# Patient Record
Sex: FEMALE | Race: WHITE | NOT HISPANIC OR LATINO | Employment: OTHER | URBAN - METROPOLITAN AREA
[De-identification: names, ages, dates, MRNs, and addresses within clinical notes are randomized per-mention and may not be internally consistent; named-entity substitution may affect disease eponyms.]

---

## 2017-01-16 ENCOUNTER — TRANSCRIBE ORDERS (OUTPATIENT)
Dept: MAMMOGRAPHY | Facility: HOSPITAL | Age: 62
End: 2017-01-16

## 2017-01-16 DIAGNOSIS — Z12.31 VISIT FOR SCREENING MAMMOGRAM: Primary | ICD-10-CM

## 2017-01-24 ENCOUNTER — HOSPITAL ENCOUNTER (OUTPATIENT)
Dept: MAMMOGRAPHY | Facility: HOSPITAL | Age: 62
Discharge: HOME | End: 2017-01-24
Attending: OBSTETRICS & GYNECOLOGY | Admitting: OBSTETRICS & GYNECOLOGY

## 2017-01-24 DIAGNOSIS — Z12.31 VISIT FOR SCREENING MAMMOGRAM: ICD-10-CM

## 2017-01-24 PROCEDURE — 77063 BREAST TOMOSYNTHESIS BI: CPT

## 2017-01-24 PROCEDURE — 77067 SCR MAMMO BI INCL CAD: CPT | Performed by: RADIOLOGY

## 2017-01-24 PROCEDURE — 77063 BREAST TOMOSYNTHESIS BI: CPT | Performed by: RADIOLOGY

## 2017-01-24 PROCEDURE — G0202 SCR MAMMO BI INCL CAD: HCPCS

## 2017-07-19 DIAGNOSIS — Z79.890 POST-MENOPAUSE ON HRT (HORMONE REPLACEMENT THERAPY): ICD-10-CM

## 2017-07-19 RX ORDER — ESTRADIOL 0.03 MG/D
FILM, EXTENDED RELEASE TRANSDERMAL
Qty: 24 PATCH | Refills: 0 | Status: SHIPPED | OUTPATIENT
Start: 2017-07-19 | End: 2017-10-03

## 2017-09-29 DIAGNOSIS — N32.81 OAB (OVERACTIVE BLADDER): ICD-10-CM

## 2017-09-29 RX ORDER — MIRABEGRON 50 MG/1
TABLET, FILM COATED, EXTENDED RELEASE ORAL
Qty: 90 TABLET | Refills: 3 | OUTPATIENT
Start: 2017-09-29

## 2017-10-03 ENCOUNTER — OFFICE VISIT (OUTPATIENT)
Dept: OBSTETRICS AND GYNECOLOGY | Facility: CLINIC | Age: 62
End: 2017-10-03

## 2017-10-03 VITALS
SYSTOLIC BLOOD PRESSURE: 134 MMHG | DIASTOLIC BLOOD PRESSURE: 80 MMHG | BODY MASS INDEX: 33.15 KG/M2 | WEIGHT: 175.6 LBS | HEIGHT: 61 IN

## 2017-10-03 DIAGNOSIS — Z01.419 ENCOUNTER FOR GYNECOLOGICAL EXAMINATION WITHOUT ABNORMAL FINDING: ICD-10-CM

## 2017-10-03 DIAGNOSIS — Z79.890 POST-MENOPAUSE ON HRT (HORMONE REPLACEMENT THERAPY): Primary | ICD-10-CM

## 2017-10-03 DIAGNOSIS — N32.81 OAB (OVERACTIVE BLADDER): ICD-10-CM

## 2017-10-03 PROCEDURE — 99396 PREV VISIT EST AGE 40-64: CPT | Performed by: OBSTETRICS & GYNECOLOGY

## 2017-10-03 RX ORDER — LOSARTAN POTASSIUM 50 MG/1
1 TABLET ORAL DAILY
COMMUNITY
Start: 2017-09-07

## 2017-10-03 RX ORDER — MIRABEGRON 50 MG/1
50 TABLET, FILM COATED, EXTENDED RELEASE ORAL DAILY
Qty: 90 TABLET | Refills: 3 | Status: SHIPPED | OUTPATIENT
Start: 2017-10-03 | End: 2018-10-09 | Stop reason: SDUPTHER

## 2017-10-03 RX ORDER — CLOBETASOL PROPIONATE 0.5 MG/G
1 CREAM TOPICAL AS NEEDED
COMMUNITY
Start: 2017-06-27

## 2017-10-03 RX ORDER — MELOXICAM 15 MG/1
1 TABLET ORAL AS NEEDED
COMMUNITY
Start: 2017-09-19 | End: 2019-10-10

## 2017-10-03 NOTE — PROGRESS NOTES
Chief Complaint   Patient presents with   • Gynecologic Exam     discuss discontinuing estrogen therapy   • Med Refill       Bindu Negron is a 62 y.o. year old  presenting to be seen for her annual exam.This patient has previously had an LSH/BSO and a laparoscopic Goldsmith procedure.  She has no symptoms of stress incontinence.  She is treated for overactive bladder with Myrbetriq, 50 mg daily and has decreased urgency and nocturia.  She does have some dryness of the mouth.  She desires to discontinue estradiol patches.  She is currently using 0.025 mg every 3-1/2 days.  She has no menopausal symptoms.    SCREENING TESTS    Year 2012   Age                         PAP                         HPV high risk                         Mammogram      benign                   RAISSA score                         Breast MRI                         Lipids                         Vitamin D                         Colonoscopy                         DEXA  Frax (hip/any)                         Ovarian Screen                             She exercises regularly: yes.  She wears her seat belt: yes.  She has concerns about domestic violence: no.  She has noticed changes in height: no    GYN screening history:  · Last mammogram: was done on approximately 2017 and the result was: Birads I (Normal)..    No Additional Complaints Reported    The following portions of the patient's history were reviewed and updated as appropriate:vital signs and   She  does not have any pertinent problems on file.  She  has a past surgical history that includes Tubal ligation; Laparoscopic Goldsmith procedure; supracervical hysterectomy salpingo oophorectomy (Bilateral); and Oophorectomy (Bilateral).  Her family history includes Depression in her mother; Heart disease in her mother; Hypertension in her father; Lung cancer in her brother. There  "is no history of Breast cancer or Ovarian cancer.  She  reports that she has never smoked. She has never used smokeless tobacco. She reports that she drinks alcohol. She reports that she does not use illicit drugs.  Current Outpatient Prescriptions   Medication Sig Dispense Refill   • clobetasol (TEMOVATE) 0.05 % cream Apply 1 application topically As Needed.     • escitalopram (LEXAPRO) 20 MG tablet Take 1 tablet by mouth daily.     • losartan (COZAAR) 50 MG tablet Take 1 tablet by mouth Daily.     • meloxicam (MOBIC) 15 MG tablet Take 1 tablet by mouth As Needed.     • MYRBETRIQ 50 MG tablet sustained-release 24 hour 24 hr tablet Take 50 mg by mouth Daily. 90 tablet 3   • omeprazole (PriLOSEC) 20 MG capsule Take 1 capsule by mouth daily.       No current facility-administered medications for this visit.      She has No Known Allergies..    Review of Systems  A comprehensive review of systems was taken.  Constitutional: negative for fever, chills, activity change, appetite change, fatigue and unexpected weight change.  Respiratory: negative  Cardiovascular: negative  Gastrointestinal: negative  Genitourinary:positive for urgency  Musculoskeletal:negative  Behavioral/Psych: negative       /80  Ht 61\" (154.9 cm)  Wt 175 lb 9.6 oz (79.7 kg)  LMP  (LMP Unknown) Comment: S/P LSH, BSO  BMI 33.18 kg/m2    Physical Exam    General:  alert; cooperative; well developed; well nourished  obese - Body mass index is 33.18 kg/(m^2).   Skin:  No suspicious lesions seen   Thyroid: normal to inspection and palpation   Lungs:  clear to auscultation bilaterally   Heart:  regular rate and rhythm, S1, S2 normal, no murmur, click, rub or gallop   Breasts:  Examined in supine position  Symmetric without masses or skin dimpling  Nipples normal without inversion, lesions or discharge  There are no palpable axillary nodes   Abdomen: soft, non-tender; no masses  no umbilical or inginual hernias are present  no hepato-splenomegaly "   Pelvis: Clinical staff was present for exam  External genitalia:  normal appearance of the external genitalia including Bartholin's and [place]'s glands.  Vaginal:  normal pink mucosa without prolapse or lesions.  Cervix:  normal appearance.  Uterus:  absent.  Adnexa:  absent, bilateral.  Rectal:  anus visually normal appearing. recto-vaginal exam unremarkable and confirms findings;     Lab Review   No data reviewed    Imaging  Mammogram results- benign         ASSESSMENT  Problems Addressed this Visit        Musculoskeletal and Integument    OAB (overactive bladder)    Relevant Medications    MYRBETRIQ 50 MG tablet sustained-release 24 hour 24 hr tablet       Genitourinary    Post-menopause on HRT (hormone replacement therapy) - Primary      Other Visit Diagnoses     Encounter for gynecological examination without abnormal finding              PLAN    Medications prescribed this encounter:    New Medications Ordered This Visit   Medications   • clobetasol (TEMOVATE) 0.05 % cream     Sig: Apply 1 application topically As Needed.   • losartan (COZAAR) 50 MG tablet     Sig: Take 1 tablet by mouth Daily.   • meloxicam (MOBIC) 15 MG tablet     Sig: Take 1 tablet by mouth As Needed.   • MYRBETRIQ 50 MG tablet sustained-release 24 hour 24 hr tablet     Sig: Take 50 mg by mouth Daily.     Dispense:  90 tablet     Refill:  3   ·   · Calcium, 600 mg/ Vit. D, 400 IU daily; regular weight-bearing exercise  · Follow up: 12 month(s)  *Please note that portions of this documentation may have been completed with a voice recognition program.  Efforts were made to edit this dictation, but occasional words may have been mistranscribed.       This note was electronically signed.    MIKE Duarte MD  October 3, 2017  11:39 AM

## 2017-10-11 DIAGNOSIS — Z79.890 POST-MENOPAUSE ON HRT (HORMONE REPLACEMENT THERAPY): ICD-10-CM

## 2017-10-11 RX ORDER — ESTRADIOL 0.03 MG/D
FILM, EXTENDED RELEASE TRANSDERMAL
Refills: 0 | OUTPATIENT
Start: 2017-10-11

## 2018-03-19 ENCOUNTER — TRANSCRIBE ORDERS (OUTPATIENT)
Dept: ADMINISTRATIVE | Facility: HOSPITAL | Age: 63
End: 2018-03-19

## 2018-03-19 DIAGNOSIS — Z12.31 VISIT FOR SCREENING MAMMOGRAM: Primary | ICD-10-CM

## 2018-04-05 ENCOUNTER — HOSPITAL ENCOUNTER (OUTPATIENT)
Dept: MAMMOGRAPHY | Facility: HOSPITAL | Age: 63
Discharge: HOME | End: 2018-04-05
Attending: OBSTETRICS & GYNECOLOGY | Admitting: OBSTETRICS & GYNECOLOGY

## 2018-04-05 DIAGNOSIS — Z12.31 VISIT FOR SCREENING MAMMOGRAM: ICD-10-CM

## 2018-04-05 PROCEDURE — 77067 SCR MAMMO BI INCL CAD: CPT | Performed by: RADIOLOGY

## 2018-04-05 PROCEDURE — 77063 BREAST TOMOSYNTHESIS BI: CPT | Performed by: RADIOLOGY

## 2018-04-05 PROCEDURE — 77067 SCR MAMMO BI INCL CAD: CPT

## 2018-04-05 PROCEDURE — 77063 BREAST TOMOSYNTHESIS BI: CPT

## 2018-10-09 ENCOUNTER — OFFICE VISIT (OUTPATIENT)
Dept: OBSTETRICS AND GYNECOLOGY | Facility: CLINIC | Age: 63
End: 2018-10-09

## 2018-10-09 VITALS
DIASTOLIC BLOOD PRESSURE: 64 MMHG | BODY MASS INDEX: 36.51 KG/M2 | HEIGHT: 61 IN | WEIGHT: 193.4 LBS | SYSTOLIC BLOOD PRESSURE: 108 MMHG

## 2018-10-09 DIAGNOSIS — N32.81 OAB (OVERACTIVE BLADDER): Primary | ICD-10-CM

## 2018-10-09 DIAGNOSIS — Z01.419 ENCOUNTER FOR GYNECOLOGICAL EXAMINATION WITHOUT ABNORMAL FINDING: ICD-10-CM

## 2018-10-09 DIAGNOSIS — Z78.0 MENOPAUSE: ICD-10-CM

## 2018-10-09 PROCEDURE — G0101 CA SCREEN;PELVIC/BREAST EXAM: HCPCS | Performed by: OBSTETRICS & GYNECOLOGY

## 2018-10-09 NOTE — PROGRESS NOTES
Chief Complaint   Patient presents with   • Gynecologic Exam   • Med Refill       Bindu Negron is a 63 y.o. year old  presenting to be seen for her annual exam.  This patient has previously had an LSH/BSO and a laparoscopic Goldsmith procedure.  She has developed symptoms of overactive bladder and is managed with mirabegron, 50 mg daily but does not have complete relief of symptoms.  She sees Dr. Zafar for urologic care.  She plans to contact him about alternative therapy.  She does not use estrogen replacement therapy.  She has had no problems with cystocele or rectocele formation.  She denies bowel symptoms.    SCREENING TESTS    Year 2012   Age                         PAP  Neg.                       HPV high risk                         Mammogram      benign benign                  RAISSA score                         Breast MRI                         Lipids                         Vitamin D                         Colonoscopy                         DEXA  Frax (hip/any)                         Ovarian Screen                             She exercises regularly: yes.  She wears her seat belt: yes.  She has concerns about domestic violence: no.  She has noticed changes in height: no    GYN screening history:  · Last mammogram: was done on approximately 2018 and the result was: Birads I (Normal)..    No Additional Complaints Reported    The following portions of the patient's history were reviewed and updated as appropriate:vital signs and   She  has a past medical history of Depression; Hypertension; OAB (overactive bladder); and Post-menopause on HRT (hormone replacement therapy).  She  does not have any pertinent problems on file.  She  has a past surgical history that includes Tubal ligation; Laparoscopic Goldsmith procedure; supracervical hysterectomy salpingo oophorectomy (Bilateral); and Oophorectomy  "(Bilateral).  Her family history includes Depression in her mother; Heart disease in her mother; Hypertension in her father; Lung cancer in her brother.  She  reports that she has never smoked. She has never used smokeless tobacco. She reports that she drinks alcohol. She reports that she does not use drugs.  Current Outpatient Prescriptions   Medication Sig Dispense Refill   • clobetasol (TEMOVATE) 0.05 % cream Apply 1 application topically As Needed.     • escitalopram (LEXAPRO) 20 MG tablet Take 1 tablet by mouth daily.     • losartan (COZAAR) 50 MG tablet Take 1 tablet by mouth Daily.     • meloxicam (MOBIC) 15 MG tablet Take 1 tablet by mouth As Needed.     • Mirabegron ER (MYRBETRIQ) 50 MG tablet sustained-release 24 hour 24 hr tablet Take 50 mg by mouth Daily. 90 tablet 3   • omeprazole (PriLOSEC) 20 MG capsule Take 1 capsule by mouth daily.       No current facility-administered medications for this visit.      She is allergic to terbinafine..    Review of Systems  A comprehensive review of systems was taken.  Constitutional: negative for fever, chills, activity change, appetite change, fatigue and unexpected weight change.  Respiratory: negative  Cardiovascular: negative  Gastrointestinal: negative  Genitourinary:positive for urgency  Musculoskeletal:negative  Behavioral/Psych: negative       /64   Ht 154.9 cm (61\")   Wt 87.7 kg (193 lb 6.4 oz)   LMP  (LMP Unknown) Comment: S/P LSH, BSO  BMI 36.54 kg/m²     Physical Exam    General:  alert; cooperative; well developed; well nourished   Skin:  No suspicious lesions seen   Thyroid: normal to inspection and palpation   Lungs:  clear to auscultation bilaterally   Heart:  regular rate and rhythm, S1, S2 normal, no murmur, click, rub or gallop   Breasts:  Examined in supine position  Symmetric without masses or skin dimpling  Nipples normal without inversion, lesions or discharge  There are no palpable axillary nodes   Abdomen: soft, non-tender; no " masses  no umbilical or inginual hernias are present  no hepato-splenomegaly   Pelvis: Clinical staff was present for exam  External genitalia:  normal appearance of the external genitalia including Bartholin's and [place]'s glands.  Vaginal:  normal pink mucosa without prolapse or lesions.  Cervix:  normal appearance.  Uterus:  absent.  Adnexa:  absent, bilateral.  Rectal:  anus visually normal appearing. recto-vaginal exam unremarkable and confirms findings;     Lab Review   No data reviewed    Imaging  Mammogram results- Birads I         ASSESSMENT  Problems Addressed this Visit        Musculoskeletal and Integument    OAB (overactive bladder) - Primary    Relevant Medications    Mirabegron ER (MYRBETRIQ) 50 MG tablet sustained-release 24 hour 24 hr tablet       Genitourinary    Menopause      Other Visit Diagnoses     Encounter for gynecological examination without abnormal finding              PLAN    Medications prescribed this encounter:    New Medications Ordered This Visit   Medications   • Mirabegron ER (MYRBETRIQ) 50 MG tablet sustained-release 24 hour 24 hr tablet     Sig: Take 50 mg by mouth Daily.     Dispense:  90 tablet     Refill:  3   · Monthly self breast assessment and annual breast imaging  · Urology follow-up  · Pap test done  · Calcium, 600 mg/ Vit. D, 400 IU daily; regular weight-bearing exercise  · Follow up: 12 month(s)  *Please note that portions of this documentation may have been completed with a voice recognition program.  Efforts were made to edit this dictation, but occasional words may have been mistranscribed.       This note was electronically signed.    MIKE Duarte MD  October 9, 2018  11:43 AM

## 2019-10-10 ENCOUNTER — OFFICE VISIT (OUTPATIENT)
Dept: OBSTETRICS AND GYNECOLOGY | Facility: CLINIC | Age: 64
End: 2019-10-10

## 2019-10-10 VITALS
DIASTOLIC BLOOD PRESSURE: 68 MMHG | BODY MASS INDEX: 32.13 KG/M2 | SYSTOLIC BLOOD PRESSURE: 102 MMHG | HEIGHT: 61 IN | WEIGHT: 170.2 LBS

## 2019-10-10 DIAGNOSIS — N32.81 OAB (OVERACTIVE BLADDER): ICD-10-CM

## 2019-10-10 DIAGNOSIS — Z78.0 MENOPAUSE: Primary | ICD-10-CM

## 2019-10-10 PROCEDURE — 99213 OFFICE O/P EST LOW 20 MIN: CPT | Performed by: OBSTETRICS & GYNECOLOGY

## 2019-10-10 RX ORDER — OXYBUTYNIN CHLORIDE 10 MG/1
10 TABLET, EXTENDED RELEASE ORAL DAILY
Qty: 30 TABLET | Refills: 11 | Status: SHIPPED | OUTPATIENT
Start: 2019-10-10 | End: 2020-10-09

## 2019-10-10 RX ORDER — NAPROXEN 500 MG/1
1 TABLET ORAL AS NEEDED
COMMUNITY
Start: 2019-10-07

## 2019-10-10 RX ORDER — PANTOPRAZOLE SODIUM 40 MG/1
1 TABLET, DELAYED RELEASE ORAL DAILY
COMMUNITY
Start: 2019-10-02

## 2019-10-10 NOTE — PROGRESS NOTES
Chief Complaint   Patient presents with   • Gynecologic Exam   • Urinary Incontinence     patient discontinued Myrbetriq due to cost and ineffectiveness.  would like to try something else.       Bindu Negron is a 64 y.o. year old  presenting to be seen because of follow-up for menopause and overactive bladder.  This patient has previously had tubal sterilization.  I did a LSH/BSO/laparoscopic Goldsmith procedure on her.  She has residual symptoms of overactive bladder, but could not afford Myrbetriq, 50 mg daily.  She desires alternative therapy.  She has recently had a laparoscopic cholecystectomy.  She has no abnormal vaginal discharge.  She denies symptoms of vaginal atrophy.  She denies bowel symptoms.    Social History     Substance and Sexual Activity   Sexual Activity No   • Birth control/protection: Abstinence     SCREENING TESTS    Year 2012   Age                         PAP       Neg.                  HPV high risk                         Mammogram       benign                  RAISSA score                         Breast MRI                         Lipids                         Vitamin D                         Colonoscopy                         DEXA  Frax (hip/any)                         Ovarian Screen                             No Additional Complaints Reported    The following portions of the patient's history were reviewed and updated as appropriate:vital signs and   She  has a past medical history of Depression, Hypertension, Menopause, and OAB (overactive bladder).  She does not have any pertinent problems on file.  She  has a past surgical history that includes Tubal ligation; Laparoscopic Goldsmith procedure; supracervical hysterectomy salpingo oophorectomy (Bilateral); Oophorectomy (Bilateral); and Laparoscopic cholecystectomy.  Her family history includes Depression in her mother; Heart  "disease in her mother; Hypertension in her father; Lung cancer in her brother.  She  reports that she has never smoked. She has never used smokeless tobacco. She reports that she drinks alcohol. She reports that she does not use drugs.  Current Outpatient Medications   Medication Sig Dispense Refill   • clobetasol (TEMOVATE) 0.05 % cream Apply 1 application topically As Needed.     • escitalopram (LEXAPRO) 20 MG tablet Take 1 tablet by mouth daily.     • losartan (COZAAR) 50 MG tablet Take 1 tablet by mouth Daily.     • naproxen (NAPROSYN) 500 MG tablet Take 1 tablet by mouth As Needed.     • oxybutynin XL (DITROPAN XL) 10 MG 24 hr tablet Take 1 tablet by mouth Daily. 30 tablet 11   • pantoprazole (PROTONIX) 40 MG EC tablet Take 1 tablet by mouth Daily.       No current facility-administered medications for this visit.      She is allergic to terbinafine..        Review of Systems  A comprehensive review of systems was not done.  Constitutional: negative for fever, chills, activity change, appetite change, fatigue and unexpected weight change.  Respiratory: not done  Cardiovascular: negative  Gastrointestinal: negative  Genitourinary:positive for urgency and frequency  Musculoskeletal:negative  Behavioral/Psych: not done          /68   Ht 154.9 cm (61\")   Wt 77.2 kg (170 lb 3.2 oz)   LMP  (LMP Unknown) Comment: S/P LSH, BSO  Breastfeeding? No   BMI 32.16 kg/m²     Physical Exam    General:  alert; cooperative; well developed; well nourished   Skin:  Not performed.   Thyroid: not examined   Lungs:  clear to auscultation bilaterally   Heart:  regular rate and rhythm, S1, S2 normal, no murmur, click, rub or gallop   Breasts:  Examined in supine position  Symmetric without masses or skin dimpling  Nipples normal without inversion, lesions or discharge  There are no palpable axillary nodes   Abdomen: soft, non-tender; no masses  no umbilical or inguinal hernias are present  no hepato-splenomegaly   Pelvis: " Clinical staff was present for exam  External genitalia:  normal appearance of the external genitalia including Bartholin's and [place]'s glands.  Vaginal:  normal pink mucosa without prolapse or lesions. the urethro-vesical angle is good  Cervix:  normal appearance.  Uterus:  absent.  Adnexa:  absent, bilateral.  Rectal:  anus visually normal appearing. recto-vaginal exam unremarkable and confirms findings;     Lab Review   No data reviewed    Imaging   Mammogram results- Birads I    Medical counseling was given to patient for the following topics: diagnostic results, instructions for management, risk factor reductions, impressions, risks and benefits of treatment options and importance of treatment compliance . Total time of the encounter was 17 minute(s) and 9 minute(s)  was spent in face to face counseling.  I have counseled the patient that I would recommend using a generic medication to treat her symptoms of OAB which are primarily urgency and frequency.  She does not have significant nocturia.  I have advised her of the side effects from oxybutynin of dry mouth and constipation.    I have counseled her to continue monthly self breast assessment and annual breast imaging.  I have advised her to resume calcium with vitamin D, 600 mg / 400 IUs daily.  I have advised her about fall prevention.          ASSESSMENT  Problems Addressed this Visit        Genitourinary    OAB (overactive bladder)    Relevant Medications    oxybutynin XL (DITROPAN XL) 10 MG 24 hr tablet    Menopause - Primary           Substance History:    reports that she has never smoked. She has never used smokeless tobacco.    reports that she drinks alcohol.    reports that she does not use drugs.    Substance use counseling is not indicated based on patient history.  Alcohol use is social.      PLAN    Medications prescribed this encounter:    New Medications Ordered This Visit   Medications   • oxybutynin XL (DITROPAN XL) 10 MG 24 hr tablet      Sig: Take 1 tablet by mouth Daily.     Dispense:  30 tablet     Refill:  11   · Monthly self breast assessment and annual breast imaging  · The patient will notify me if she does not have relief of symptoms on oxybutynin XL, 10 mg daily.  · Follow up: 12 month(s)  · Calcium, 600 mg/ Vit. D, 400 IU daily     *Please note that portions of this documentation may have been completed with a voice recognition program.  Efforts were made to edit this dictation, but occasional words may have been mistranscribed.     This note was electronically signed.    MIKE Duarte MD  October 10, 2019  11:43 AM

## 2019-10-21 ENCOUNTER — TRANSCRIBE ORDERS (OUTPATIENT)
Dept: ADMINISTRATIVE | Facility: HOSPITAL | Age: 64
End: 2019-10-21

## 2019-10-21 DIAGNOSIS — Z12.31 VISIT FOR SCREENING MAMMOGRAM: Primary | ICD-10-CM

## 2019-12-30 ENCOUNTER — HOSPITAL ENCOUNTER (OUTPATIENT)
Dept: MAMMOGRAPHY | Facility: HOSPITAL | Age: 64
Discharge: HOME | End: 2019-12-30
Admitting: OBSTETRICS & GYNECOLOGY

## 2019-12-30 DIAGNOSIS — Z12.31 VISIT FOR SCREENING MAMMOGRAM: ICD-10-CM

## 2019-12-30 PROCEDURE — 77063 BREAST TOMOSYNTHESIS BI: CPT

## 2019-12-30 PROCEDURE — 77067 SCR MAMMO BI INCL CAD: CPT | Performed by: RADIOLOGY

## 2019-12-30 PROCEDURE — 77063 BREAST TOMOSYNTHESIS BI: CPT | Performed by: RADIOLOGY

## 2019-12-30 PROCEDURE — 77067 SCR MAMMO BI INCL CAD: CPT

## 2020-10-12 ENCOUNTER — OFFICE VISIT (OUTPATIENT)
Dept: OBSTETRICS AND GYNECOLOGY | Facility: CLINIC | Age: 65
End: 2020-10-12

## 2020-10-12 VITALS
TEMPERATURE: 97.7 F | WEIGHT: 186.8 LBS | SYSTOLIC BLOOD PRESSURE: 122 MMHG | BODY MASS INDEX: 35.27 KG/M2 | DIASTOLIC BLOOD PRESSURE: 66 MMHG | HEIGHT: 61 IN

## 2020-10-12 DIAGNOSIS — Z01.419 ENCOUNTER FOR GYNECOLOGICAL EXAMINATION WITHOUT ABNORMAL FINDING: ICD-10-CM

## 2020-10-12 DIAGNOSIS — Z78.0 MENOPAUSE: Primary | ICD-10-CM

## 2020-10-12 DIAGNOSIS — N32.81 OAB (OVERACTIVE BLADDER): ICD-10-CM

## 2020-10-12 PROCEDURE — G0101 CA SCREEN;PELVIC/BREAST EXAM: HCPCS | Performed by: OBSTETRICS & GYNECOLOGY

## 2020-10-12 RX ORDER — OXYBUTYNIN CHLORIDE 10 MG/1
10 TABLET, EXTENDED RELEASE ORAL DAILY
COMMUNITY
End: 2020-10-12 | Stop reason: SDUPTHER

## 2020-10-12 RX ORDER — OXYBUTYNIN CHLORIDE 15 MG/1
15 TABLET, EXTENDED RELEASE ORAL DAILY
Qty: 90 TABLET | Refills: 3 | Status: SHIPPED | OUTPATIENT
Start: 2020-10-12 | End: 2021-10-12

## 2020-10-12 NOTE — PROGRESS NOTES
No chief complaint on file.      Bindu Negron is a 65 y.o. year old  presenting to be seen for her annual exam.  This patient has previously had tubal sterilization.  I did a laparoscopic supracervical hysterectomy/bilateral salpingo-oophorectomy/laparoscopic Goldsmith procedure for an enlarged uterus and stress urinary incontinence.  She has subsequently developed symptoms of overactive bladder with urgency, frequency, and nocturia.  She has been successfully treated with oxybutynin XL, 10 mg daily.  She would like to increase the dose to 15 mg daily.  She has some dry mouth as a side effect.  She denies bowel symptoms.    SCREENING TESTS    Year 2012 2016 2017   Age                         PAP       Neg.                  HPV high risk                         Mammogram       benign benign                 RAISSA score                         Breast MRI                         Lipids                         Vitamin D                         Colonoscopy                         DEXA  Frax (hip/any)                         Ovarian Screen                             She exercises regularly: yes.  She wears her seat belt: yes.  She has concerns about domestic violence: no.  She has noticed changes in height: no    GYN screening history:  · Last mammogram: was done on approximately 2019 and the result was: Birads I (Normal)..    No Additional Complaints Reported    The following portions of the patient's history were reviewed and updated as appropriate:vital signs and   She  has a past medical history of Depression, Hypertension, Menopause, and OAB (overactive bladder).  She does not have any pertinent problems on file.  She  has a past surgical history that includes Tubal ligation; Laparoscopic Goldsmith procedure; supracervical hysterectomy salpingo oophorectomy (Bilateral); Oophorectomy (Bilateral); and Laparoscopic  "cholecystectomy.  Her family history includes Depression in her mother; Heart disease in her mother; Hypertension in her father; Lung cancer in her brother.  She  reports that she has never smoked. She has never used smokeless tobacco. She reports current alcohol use. She reports that she does not use drugs.  Current Outpatient Medications   Medication Sig Dispense Refill   • oxybutynin XL (DITROPAN XL) 15 MG 24 hr tablet Take 1 tablet by mouth Daily. 90 tablet 3   • clobetasol (TEMOVATE) 0.05 % cream Apply 1 application topically As Needed.     • escitalopram (LEXAPRO) 20 MG tablet Take 1 tablet by mouth daily.     • losartan (COZAAR) 50 MG tablet Take 1 tablet by mouth Daily.     • naproxen (NAPROSYN) 500 MG tablet Take 1 tablet by mouth As Needed.     • pantoprazole (PROTONIX) 40 MG EC tablet Take 1 tablet by mouth Daily.       No current facility-administered medications for this visit.      She is allergic to terbinafine..    Review of Systems  A review of systems was taken.  She denies cough, fever, shortness of breath, and loss of her sense of taste or smell.  Constitutional: negative for fever, chills, activity change, appetite change, fatigue and unexpected weight change.  Respiratory: negative  Cardiovascular: negative  Gastrointestinal: negative  Genitourinary:negative  Musculoskeletal:negative  Behavioral/Psych: negative       /66   Temp 97.7 °F (36.5 °C)   Ht 154.9 cm (61\")   Wt 84.7 kg (186 lb 12.8 oz)   LMP  (LMP Unknown) Comment: S/P LSH, BSO  Breastfeeding No   BMI 35.30 kg/m²     Physical Exam    General:  alert; cooperative; well developed; well nourished   Skin:  No suspicious lesions seen   Thyroid: normal to inspection and palpation   Lungs:  clear to auscultation bilaterally   Heart:  regular rate and rhythm, S1, S2 normal, no murmur, click, rub or gallop   Breasts:  Examined in supine position  Symmetric without masses or skin dimpling  Nipples normal without inversion, lesions or " discharge  There are no palpable axillary nodes   Abdomen: soft, non-tender; no masses  no umbilical or inguinal hernias are present  no hepato-splenomegaly   Pelvis: Clinical staff was present for exam  External genitalia:  normal appearance of the external genitalia including Bartholin's and [place]'s glands.  Vaginal:  normal pink mucosa without prolapse or lesions.  Cervix:  normal appearance.  Uterus:  absent.  Adnexa:  absent, bilateral.  Rectal:  anus visually normal appearing. recto-vaginal exam unremarkable and confirms findings;     Lab Review   No data reviewed    Imaging  Mammogram results         ASSESSMENT  Problems Addressed this Visit        Genitourinary    OAB (overactive bladder)    Relevant Medications    oxybutynin XL (DITROPAN XL) 15 MG 24 hr tablet    Menopause - Primary      Other Visit Diagnoses     Encounter for gynecological examination without abnormal finding          Diagnoses       Codes Comments    Menopause    -  Primary ICD-10-CM: Z78.0  ICD-9-CM: 627.2     OAB (overactive bladder)     ICD-10-CM: N32.81  ICD-9-CM: 596.51     Encounter for gynecological examination without abnormal finding     ICD-10-CM: Z01.419  ICD-9-CM: V72.31               Substance History:   reports that she has never smoked. She has never used smokeless tobacco.   reports current alcohol use.   reports no history of drug use.    Substance use counseling is not indicated based on patient history.  She indicates that her alcohol use is social, and controlled.      PLAN    Medications prescribed this encounter:    New Medications Ordered This Visit   Medications   • oxybutynin XL (DITROPAN XL) 15 MG 24 hr tablet     Sig: Take 1 tablet by mouth Daily.     Dispense:  90 tablet     Refill:  3   · Monthly self breast assessment and annual breast imaging  · The patient will contact me if she has increased side effects on the 15 mg oxybutynin dose.  · Calcium, 600 mg/ Vit. D, 400 IU daily; regular weight-bearing  exercise  · Follow up: 12 month(s)  *Please note that portions of this documentation may have been completed with a voice recognition program.  Efforts were made to edit this dictation, but occasional words may have been mistranscribed.       This note was electronically signed.    MIKE Duarte MD  October 12, 2020  15:56 EDT

## 2021-02-01 ENCOUNTER — TRANSCRIBE ORDERS (OUTPATIENT)
Dept: ADMINISTRATIVE | Facility: HOSPITAL | Age: 66
End: 2021-02-01

## 2021-02-01 DIAGNOSIS — Z12.31 VISIT FOR SCREENING MAMMOGRAM: Primary | ICD-10-CM

## 2021-04-06 ENCOUNTER — APPOINTMENT (OUTPATIENT)
Dept: MAMMOGRAPHY | Facility: HOSPITAL | Age: 66
End: 2021-04-06

## 2021-06-04 ENCOUNTER — APPOINTMENT (OUTPATIENT)
Dept: MAMMOGRAPHY | Facility: HOSPITAL | Age: 66
End: 2021-06-04